# Patient Record
Sex: MALE | Race: ASIAN | NOT HISPANIC OR LATINO
[De-identification: names, ages, dates, MRNs, and addresses within clinical notes are randomized per-mention and may not be internally consistent; named-entity substitution may affect disease eponyms.]

---

## 2022-01-27 PROBLEM — Z00.00 ENCOUNTER FOR PREVENTIVE HEALTH EXAMINATION: Status: ACTIVE | Noted: 2022-01-27

## 2022-02-16 ENCOUNTER — APPOINTMENT (OUTPATIENT)
Dept: COLORECTAL SURGERY | Facility: CLINIC | Age: 37
End: 2022-02-16
Payer: MEDICAID

## 2022-02-16 VITALS
HEIGHT: 68 IN | TEMPERATURE: 98 F | DIASTOLIC BLOOD PRESSURE: 69 MMHG | BODY MASS INDEX: 27.28 KG/M2 | WEIGHT: 180 LBS | SYSTOLIC BLOOD PRESSURE: 125 MMHG

## 2022-02-16 DIAGNOSIS — K61.0 ANAL ABSCESS: ICD-10-CM

## 2022-02-16 PROCEDURE — 46600 DIAGNOSTIC ANOSCOPY SPX: CPT

## 2022-02-16 PROCEDURE — 99203 OFFICE O/P NEW LOW 30 MIN: CPT | Mod: 25

## 2022-02-16 NOTE — ASSESSMENT
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  Advised MR for further assessment of the degree of sphincter involvement.  The role of surgery- anal fistulotomy was outlined.  The risks and befits of the treatment plan were reviewed and outlined with the patient. The patient understands the associated risks of the involved treatment and wishes to proceed. All questions were answered and explained. We will await results of MRI and scheduled colonoscopy with GI before final scheduling of surgery.\par \par \par A chinese  was utilized for the entire visit . All questions were addressed.\par

## 2022-02-16 NOTE — PHYSICAL EXAM
[Excoriation] : no perianal excoriation [Fistula] : a fistula [] : in the 9:00 position [Normal] : was normal [None] : there was no rectal mass  [FreeTextEntry1] : Medical assistant was present for the entire exam.\par \par Anoscopy was performed for evaluation of the patients rectal bleeding  history .\par The risks, benefits and alternatives were reviewed.\par \par A lighted anoscope was passed into the anal canal and the entire anal mucosal surface was inspected..  \par The findings revealed moderate internal hemorrhoids.\par No masses or lesions were identified.\par \par

## 2022-02-16 NOTE — HISTORY OF PRESENT ILLNESS
[FreeTextEntry1] : 37 yo Mandarin speaking M presents for evaluation of perianal fistula\par PSH hernia repair (2013)\par \par Reviewed GI notes. Pt previously evaluated by GI for c/o rectal pain and bleeding and experienced a "rectal nodule" in 8/2021 that ruptured and drained yellow discharge, s/p treatment w/ antibiotics. Pt seen by GI 1/27/22 for f/u c/o recurrent drainage. Per GI notes, exam noted mild hemorrhoid, 3 mm granuloma w/ fistula opening at 7-8 o'clock, no active draining\par \par Patient scheduled for colonoscopy to r/o IBD on --\par Advised to schedule evaluation with this office after colonoscopy\par \par \par \par BH: regular  No constipation, straining or diarrhea\par  intake of dietary fiber and water\par  use of stool softeners or fiber supplements\par \par \par Denies [facility] colorectal CA or IBD\par No  ASA/NSAIDs in last 7 days\par \par Reports persistent drainage from perianal nodule. Occasional itch. No anal pain.\par

## 2022-03-03 ENCOUNTER — APPOINTMENT (OUTPATIENT)
Dept: MRI IMAGING | Facility: CLINIC | Age: 37
End: 2022-03-03
Payer: MEDICAID

## 2022-03-03 ENCOUNTER — OUTPATIENT (OUTPATIENT)
Dept: OUTPATIENT SERVICES | Facility: HOSPITAL | Age: 37
LOS: 1 days | End: 2022-03-03

## 2022-03-03 PROCEDURE — 72197 MRI PELVIS W/O & W/DYE: CPT | Mod: 26

## 2022-04-04 ENCOUNTER — APPOINTMENT (OUTPATIENT)
Dept: COLORECTAL SURGERY | Facility: CLINIC | Age: 37
End: 2022-04-04
Payer: MEDICAID

## 2022-04-04 NOTE — HISTORY OF PRESENT ILLNESS
[FreeTextEntry1] : Pt is a 37 yo  Mandarin speaking M presents today for f/u\par PS Hernia repair (2013)\par \par Last seen 2/16/22 for evaluation of perianal fistula. \par Exam notable for fistula in the 9:00 position, but no perianal excoriation. \par Moderate internal hemorrhoids on anoscopy.\par \par Advised MRI for further assessment of sphincter involvement. Discussed anal fistulotomy. \par Scheduled colonoscopy with GI before surgery- \par \par MRI Pelvis w/wo IV Cont Done 3/3/22- \par FINDINGS:\par \par ANAL FISTULA:\par \par Small active fistula with fistulous tract in the left anterior quadrant in the intersphincteric space with cephalad portion at about 1:00 axis in supine position and dermal orifice in the anterior aspect of the anal verge on the left side. It measures 1.5 cm in length. Another tiny active fistula in the posterior midline at about 6-7 o'clock axis in supine position with dermal along the posterior aspect of the anal verge, slightly to the right. It measures 1.8 cm in length. No visible abscess.\par \par Colonoscopy 3/10/22 by Dr. Ambreen Sarah, advanced to the terminal ileum with identification of the appendiceal orifice and IC valve. Bowel prep fair. Findings: non-bleeding internal hemorrhoids were found, moderate. \par

## 2022-04-20 ENCOUNTER — APPOINTMENT (OUTPATIENT)
Dept: COLORECTAL SURGERY | Facility: CLINIC | Age: 37
End: 2022-04-20
Payer: MEDICAID

## 2022-04-20 VITALS
DIASTOLIC BLOOD PRESSURE: 81 MMHG | TEMPERATURE: 98 F | HEART RATE: 54 BPM | WEIGHT: 176 LBS | BODY MASS INDEX: 26.67 KG/M2 | HEIGHT: 68 IN | SYSTOLIC BLOOD PRESSURE: 120 MMHG

## 2022-04-20 PROCEDURE — 99214 OFFICE O/P EST MOD 30 MIN: CPT

## 2022-04-20 NOTE — HISTORY OF PRESENT ILLNESS
[FreeTextEntry1] : 35 yo Mandarin speaking M presents for f/u perianal fistula\par \par h/o rectal bleeding and abscess\par Seen for initial exam on 2/16/22, fistula noted at 9:00 position.\par Pt referred for MRI pelvis (3/3/22):\par IMPRESSION:\par \par 2 small intersphincteric anal fistulas, one in the left anterior quadrant at about 1:00 axis and the other in the posterior midline at about 6-7 o'clock axis in supine position. No residual abscess visualized.\par \par Colonoscopy completed w/ Dr. Sarah 3/10/22: moderate hemorrhoids noted. Prep Fair. No specimens collected.\par \par Reports persistent intermittent drainage.  Mild discomfort.\par

## 2022-04-20 NOTE — ASSESSMENT
[FreeTextEntry1] : I reviewed with the patient the findings on examination and MRI are consistent with an intersphincteric fistula.  I have discussed with him the treatment options.  Patient wishes to proceed with planned anal fistulotomy.  The risk, benefits and alternatives were detailed including but not limited to postprocedure pain, bleeding, leakage, permanent seepage, and possible need for future procedures.  All questions answered.  Instructions reviewed.\par \par A chinese  was utilized for the entire visit . All questions were addressed.\par

## 2022-04-20 NOTE — PHYSICAL EXAM
[Excoriation] : no perianal excoriation [Fistula] : a fistula [] : in the 9:00 position [Normal] : was normal [None] : there was no rectal mass  [de-identified] : Left anterior anal fistula.  No evidence of posterior fistula

## 2022-09-15 ENCOUNTER — APPOINTMENT (OUTPATIENT)
Dept: COLORECTAL SURGERY | Facility: HOSPITAL | Age: 37
End: 2022-09-15

## 2023-04-21 ENCOUNTER — APPOINTMENT (OUTPATIENT)
Dept: COLORECTAL SURGERY | Facility: CLINIC | Age: 38
End: 2023-04-21
Payer: MEDICAID

## 2023-04-21 VITALS
HEIGHT: 68 IN | TEMPERATURE: 97.7 F | WEIGHT: 182 LBS | DIASTOLIC BLOOD PRESSURE: 76 MMHG | HEART RATE: 66 BPM | BODY MASS INDEX: 27.58 KG/M2 | SYSTOLIC BLOOD PRESSURE: 115 MMHG

## 2023-04-21 PROCEDURE — 99214 OFFICE O/P EST MOD 30 MIN: CPT

## 2023-04-21 NOTE — HISTORY OF PRESENT ILLNESS
[FreeTextEntry1] : 38 y/o Mandarin speaking M presents for surgical discussion of anal fistula \par \par H/o rectal bleeding and perianal abscess \par \par Seen for initial consultation 2/16/22, fistula noted at 9:00 position\par \par MRI obtained 3/3/22\par Impression: \par 2 small intersphincteric anal fistulas, one in the left anterior quadrant at about 1:00 axis and the other in the posterior midline at about 6-7 o'clock axis in supine position. No residual abscess visualized.\par \par Colonoscopy completed w/Dr. Sarah 3/10/22: moderate hemorrhoids appreciated. Prep was fair. No specimens collected. \par \par Seen in follow up 4/20/22, patient reports persistent intermittent drainage. Mild discomfort. On exam, no anal fissure appreciated. Perianal fistula in the 9:00 position and left anterior anal fistula. No evidence of posterior fistula, no perianal excoriation. Sphincter tone was normal. No rectal tenderness. Discussed treatment options. Patient wishes to proceed w/ anal fistulotomy. \par \par Patient presents today for surgical discussion

## 2023-04-21 NOTE — ASSESSMENT
[FreeTextEntry1] : I reviewed with the patient the options for treatment of his anal fistula including surgical treatment–anal fistulotomy.  The risk, benefits and alternatives have been outlined including but limited permanent leakage, seepage and recurrent fistula/abscess and needing future procedures.\par The patient consents to plan treatment.\par A chinese  was utilized for the entire visit . All questions were addressed.\par

## 2023-04-21 NOTE — PHYSICAL EXAM
[Excoriation] : no perianal excoriation [Fistula] : a fistula [] : in the 9:00 position [Normal] : was normal [None] : there was no rectal mass  [de-identified] : Left anterior anal fistula.  No evidence of posterior fistula

## 2023-05-17 ENCOUNTER — TRANSCRIPTION ENCOUNTER (OUTPATIENT)
Age: 38
End: 2023-05-17

## 2023-05-17 RX ORDER — FENTANYL CITRATE 50 UG/ML
25 INJECTION INTRAVENOUS
Refills: 0 | Status: DISCONTINUED | OUTPATIENT
Start: 2023-05-18 | End: 2023-05-18

## 2023-05-17 RX ORDER — ACETAMINOPHEN 500 MG
1000 TABLET ORAL ONCE
Refills: 0 | Status: DISCONTINUED | OUTPATIENT
Start: 2023-05-18 | End: 2023-05-18

## 2023-05-17 NOTE — ASU PATIENT PROFILE, ADULT - NSICDXPASTMEDICALHX_GEN_ALL_CORE_FT
PAST MEDICAL HISTORY:  No pertinent past medical history PAST MEDICAL HISTORY:  Chronic hepatitis B Carrier    Gallbladder polyp

## 2023-05-17 NOTE — ASU PATIENT PROFILE, ADULT - SURGERY TIME
Quality 110: Preventive Care And Screening: Influenza Immunization: Influenza Immunization Administered during Influenza season Detail Level: Detailed 09:30 08:30

## 2023-05-17 NOTE — ASU PATIENT PROFILE, ADULT - NS PREOP UNDERSTANDS INFO
npo and bowel  prep per MD , clears allowed up to 5am, bring ID and insurance card , no valuables or jewelry, wear comfortable clothing/yes

## 2023-05-18 ENCOUNTER — TRANSCRIPTION ENCOUNTER (OUTPATIENT)
Age: 38
End: 2023-05-18

## 2023-05-18 ENCOUNTER — APPOINTMENT (OUTPATIENT)
Dept: COLORECTAL SURGERY | Facility: HOSPITAL | Age: 38
End: 2023-05-18

## 2023-05-18 ENCOUNTER — OUTPATIENT (OUTPATIENT)
Dept: OUTPATIENT SERVICES | Facility: HOSPITAL | Age: 38
LOS: 1 days | Discharge: HOME | End: 2023-05-18
Payer: MEDICAID

## 2023-05-18 VITALS
SYSTOLIC BLOOD PRESSURE: 119 MMHG | HEIGHT: 67.5 IN | DIASTOLIC BLOOD PRESSURE: 73 MMHG | RESPIRATION RATE: 16 BRPM | HEART RATE: 54 BPM | WEIGHT: 175.27 LBS | TEMPERATURE: 97 F | OXYGEN SATURATION: 99 %

## 2023-05-18 VITALS
HEART RATE: 62 BPM | RESPIRATION RATE: 16 BRPM | TEMPERATURE: 97 F | SYSTOLIC BLOOD PRESSURE: 104 MMHG | DIASTOLIC BLOOD PRESSURE: 60 MMHG | OXYGEN SATURATION: 98 %

## 2023-05-18 DIAGNOSIS — Z90.49 ACQUIRED ABSENCE OF OTHER SPECIFIED PARTS OF DIGESTIVE TRACT: Chronic | ICD-10-CM

## 2023-05-18 DIAGNOSIS — Z98.890 OTHER SPECIFIED POSTPROCEDURAL STATES: Chronic | ICD-10-CM

## 2023-05-18 PROCEDURE — 46280 REMOVE ANAL FIST COMPLEX: CPT | Mod: GC

## 2023-05-18 DEVICE — SURGICEL 4 X 8": Type: IMPLANTABLE DEVICE | Site: ANAL FISTULOTOMY | Status: FUNCTIONAL

## 2023-05-18 RX ORDER — OXYCODONE HYDROCHLORIDE 5 MG/1
1 TABLET ORAL
Qty: 12 | Refills: 0
Start: 2023-05-18 | End: 2023-05-20

## 2023-05-18 RX ORDER — OXYCODONE 5 MG/1
5 TABLET ORAL
Qty: 10 | Refills: 0 | Status: ACTIVE | COMMUNITY
Start: 2023-05-18 | End: 1900-01-01

## 2023-05-18 RX ORDER — DOCUSATE SODIUM 100 MG/1
100 CAPSULE ORAL TWICE DAILY
Qty: 60 | Refills: 3 | Status: ACTIVE | COMMUNITY
Start: 2023-05-18 | End: 1900-01-01

## 2023-05-18 RX ORDER — DOCUSATE SODIUM 100 MG
1 CAPSULE ORAL
Qty: 14 | Refills: 0
Start: 2023-05-18 | End: 2023-05-24

## 2023-05-18 NOTE — ASU DISCHARGE PLAN (ADULT/PEDIATRIC) - NS MD DC FALL RISK RISK
For information on Fall & Injury Prevention, visit: [email or web address] OR  [email or web address] OR  https://www.cdc.gov/steadi/patient.html

## 2023-05-18 NOTE — BRIEF OPERATIVE NOTE - NSICDXBRIEFPROCEDURE_GEN_ALL_CORE_FT
PROCEDURES:  Placement, seton, using vessel loop 18-May-2023 09:29:40  Shawn Carrasco  Intersphincteric anal fistulotomy 18-May-2023 09:30:38  Shawn Carrasco

## 2023-05-18 NOTE — ASU PREOP CHECKLIST - SITE MARKED BY SURGEON
traZODone (DESYREL) 50 MG tablet [[id number]]    Electronically signed by: Sylvia Lugo MD on 03/17/20 0904 Status: Discontinued   Ordering user: Sylvia Lugo MD 03/17/20 0904 Authorized by: Sylvia Lugo MD   Frequency:  03/17/20 - 01/18/21  Released by: Sylvia Lugo MD 03/17/20 0904   Discontinued by: Sylvia Lugo MD 01/18/21 1257 [Side effects]   Diagnoses   Major depressive disorder, single episode, unspecified [F32.9]        n/a

## 2023-05-18 NOTE — ASU DISCHARGE PLAN (ADULT/PEDIATRIC) - MEDICATION INSTRUCTIONS
Pain control:  Please take 2 tabs of extra strength Tylenol every 6 hours as needed for pain. DO NOT exceed 4000 mg per day. You have been prescribed oxycodone for pain not controlled by Tylenol. Take 1 tab every 6 hours as needed for severe pain. Please do not exceed 4 tabs per day. Take prescribed stool softener (colace) to prevent constipation caused by oxycodone

## 2023-05-18 NOTE — ASU DISCHARGE PLAN (ADULT/PEDIATRIC) - ASU DC SPECIAL INSTRUCTIONSFT
Please follow up with Dr. Quintanilla next week. Call his office to schedule an appointment: [phone]. Call the office if you experience increasing pain, nausea, vomiting, swelling, redness, or temperature >101.4F.     Sitz bathes are to help relieve discomfort in your perineum. The perineum is the area between the vagina and rectum on a female and the area between the scrotum and  rectum on a male. The sitz bath also helps in cleansing and promotes healing.    Pain control:  Please take 2 tabs of extra strength Tylenol every 6 hours as needed for pain. DO NOT exceed 4000 mg per day. You have been prescribed oxycodone for pain not controlled by Tylenol. Take 1 tab every 6 hours as needed for severe pain. Please do not exceed 4 tabs per day. Take prescribed stool softener (colace) to prevent constipation caused by oxycodone    General Discharge Instructions:  Please resume all regular home medications unless specifically advised not to take a particular medication. Also, please take any new medications as prescribed.  Please get plenty of rest, continue to ambulate several times per day, and drink adequate amounts of fluids. Avoid lifting weights greater than 5-10 lbs until you follow-up with your surgeon, who will instruct you further regarding activity restrictions.  Avoid driving or operating heavy machinery while taking pain medications.  Please follow-up with your surgeon and Primary Care Provider (PCP) as advised.  Incision Care:  *Please call your doctor or nurse practitioner if you have increased pain, swelling, redness, or drainage from the incision site.  *Avoid swimming and baths until your follow-up appointment.  *You may shower, and wash surgical incisions with a mild soap and warm water. Gently pat the area dry.    Warning Signs:  Please call your doctor or nurse practitioner if you experience the following:  *You experience new chest pain, pressure, squeezing or tightness.  *New or worsening cough, shortness of breath, or wheeze.  *If you are vomiting and cannot keep down fluids or your medications.  *You are getting dehydrated due to continued vomiting, diarrhea, or other reasons. Signs of dehydration include dry mouth, rapid heartbeat, or feeling dizzy or faint when standing.  *You see blood or dark/black material when you vomit or have a bowel movement.  *You experience burning when you urinate, have blood in your urine, or experience a discharge.  *Your pain is not improving within 8-12 hours or is not gone within 24 hours. Call or return immediately if your pain is getting worse, changes location, or moves to your chest or back.  *You have shaking chills, or fever greater than 101.5 degrees Fahrenheit or 38 degrees Celsius.  *Any change in your symptoms, or any new symptoms that concern you.

## 2023-05-18 NOTE — ASU DISCHARGE PLAN (ADULT/PEDIATRIC) - CARE PROVIDER_API CALL
Ruperto Quintanilla)  ColonRectal Surgery; Surgery  [facility], 2nd Floor  [place], [place] [address]  Phone: [phone]  Fax: [phone]  Follow Up Time:

## 2023-05-18 NOTE — BRIEF OPERATIVE NOTE - OPERATION/FINDINGS
INCOMPELTE Patient placed in prone dale knife. 2x fistulas noted with external opening on the 12oclock and 7oclock positions. 7oclock fistula probed, unroofed and excoriated using electrocautery. 12oclock fistula probed and partially unroofed. Internal sphincter identified and seton placed and tied with 2x silk 2-0 ties. Surgical site copiously irrigated and hemostasis confirmed.

## 2023-05-30 PROBLEM — K82.4 CHOLESTEROLOSIS OF GALLBLADDER: Chronic | Status: ACTIVE | Noted: 2023-05-18

## 2023-05-30 PROBLEM — B18.1 CHRONIC VIRAL HEPATITIS B WITHOUT DELTA-AGENT: Chronic | Status: ACTIVE | Noted: 2023-05-18

## 2023-07-05 ENCOUNTER — APPOINTMENT (OUTPATIENT)
Dept: COLORECTAL SURGERY | Facility: CLINIC | Age: 38
End: 2023-07-05
Payer: MEDICAID

## 2023-07-05 VITALS
TEMPERATURE: 98.2 F | BODY MASS INDEX: 27.28 KG/M2 | HEIGHT: 68 IN | SYSTOLIC BLOOD PRESSURE: 124 MMHG | DIASTOLIC BLOOD PRESSURE: 78 MMHG | WEIGHT: 180 LBS

## 2023-07-05 PROCEDURE — 46030 REMOVAL ANAL SETON OTH MRK: CPT | Mod: 58

## 2023-07-05 PROCEDURE — 99024 POSTOP FOLLOW-UP VISIT: CPT

## 2023-07-05 NOTE — PHYSICAL EXAM
[Normal] : was normal [None] : there was no rectal mass  [Excoriation] : no perianal excoriation [Fistula] : no fistulas [de-identified] : posterior anal margin- seton in place. superficial skin bridge seton removed.

## 2023-07-05 NOTE — HISTORY OF PRESENT ILLNESS
[FreeTextEntry1] : 38 yo M presents for post op follow up of anal fistula\par \par s/p EUA, fistulotomy to left anterior anal fistula and partial fistulotomy to posterior anal canal fistula with seton placement to superficial aspect of the internal sphincter complex on 5/18/23\par \par Denies pain. No leakage.\par \par Occasional blood tissue paper.\par

## 2023-10-18 ENCOUNTER — APPOINTMENT (OUTPATIENT)
Dept: COLORECTAL SURGERY | Facility: CLINIC | Age: 38
End: 2023-10-18
Payer: MEDICAID

## 2023-10-18 VITALS
BODY MASS INDEX: 27.58 KG/M2 | SYSTOLIC BLOOD PRESSURE: 122 MMHG | HEART RATE: 57 BPM | DIASTOLIC BLOOD PRESSURE: 69 MMHG | HEIGHT: 68 IN | TEMPERATURE: 97.6 F | WEIGHT: 182 LBS

## 2023-10-18 DIAGNOSIS — K60.3 ANAL FISTULA: ICD-10-CM

## 2023-10-18 PROCEDURE — 99213 OFFICE O/P EST LOW 20 MIN: CPT

## 2023-10-20 NOTE — REASON FOR VISIT
[Post Op: _________] : a [unfilled] post op visit
normal/clear to auscultation bilaterally/no wheezes/no rales/no rhonchi

## 2025-06-24 NOTE — ASU PATIENT PROFILE, ADULT - NS PRO PT RIGHT SUPPORT PERSON
Will have the patient use left eye  Prednisolone (roll this gently between your hands before use)  2x/d for 1 wk then 1x/d for 1 wk then stop  Ketorolac stop  Moxifloxacin FOUR TIMES PER DAY for 1 week then stop  Wait 5 minutes between drops    Will have patient return to Dr. Amin on 6/26/2025    No swimming for 2 weeks  No lifting greater than 10 pounds the first week and 20 pounds the second week  No bending past the waist for the first 1 week  You can shower now, but dont get the eye wet for the first 1 week  Wear your eye shield anytime that you are sleeping or resting for the first 1 week (don't want to accidentally bump the eye when sleeping)       From here on out, things should be getting better (or staying the same).  If you feel like things are getting worse (increased pain, increased redness, decreased vision), that would be abnormal and a reason to give us a call.  If you just feel that things aren't any better, that's normal and you just need more time.      Written instructions detailing restrictions, precautions, and any drops were printed for the patient with their after visit summary. All questions were answered to the patient's satisfaction. They were instructed any further questions once to please contact us at [phone].  
same name as above

## (undated) DEVICE — SYR ASEPTO

## (undated) DEVICE — POSITIONER FOAM EGG CRATE ULNAR 2PCS (PINK)

## (undated) DEVICE — POSITIONER STRAP KNEE & BODY 3X60" DISP

## (undated) DEVICE — ELCTR STRYKER NEPTUNE BLADE COATED, INSULATED 70MM

## (undated) DEVICE — SPECIMEN CONTAINER 4OZ

## (undated) DEVICE — WARMING BLANKET UPPER ADULT

## (undated) DEVICE — GOWN ROYAL SILK XL

## (undated) DEVICE — GLV 7.5 PROTEXIS (WHITE)

## (undated) DEVICE — SUT VICRYL 3-0 18" SH (POP-OFF)

## (undated) DEVICE — NDL HYPO SAFE 22G X 1.5" (BLACK)

## (undated) DEVICE — TAPE SILK 3"

## (undated) DEVICE — VESSEL LOOP MAXI-BLUE 0.120" X 16"

## (undated) DEVICE — ELCTR PENCIL SMOKE EVACUATOR COATED PUSH BUTTON 70MM

## (undated) DEVICE — GLV 8 PROTEXIS (WHITE)

## (undated) DEVICE — SUT CHROMIC 2-0 27" CT-2

## (undated) DEVICE — DRSG TELFA 3 X 8

## (undated) DEVICE — DRSG CURITY GAUZE SPONGE 4 X 8" 12-PLY NON-STERILE

## (undated) DEVICE — PACK COLO RECTAL

## (undated) DEVICE — LUBRICATING JELLY ONESHOT 1.25OZ

## (undated) DEVICE — VENODYNE/SCD SLEEVE CALF MEDIUM